# Patient Record
Sex: MALE | Race: BLACK OR AFRICAN AMERICAN | NOT HISPANIC OR LATINO | URBAN - METROPOLITAN AREA
[De-identification: names, ages, dates, MRNs, and addresses within clinical notes are randomized per-mention and may not be internally consistent; named-entity substitution may affect disease eponyms.]

---

## 2019-05-29 ENCOUNTER — OFFICE (OUTPATIENT)
Dept: URBAN - METROPOLITAN AREA CLINIC 19 | Facility: CLINIC | Age: 39
End: 2019-05-29

## 2019-05-29 VITALS
HEART RATE: 72 BPM | HEIGHT: 72 IN | DIASTOLIC BLOOD PRESSURE: 77 MMHG | SYSTOLIC BLOOD PRESSURE: 129 MMHG | WEIGHT: 249 LBS

## 2019-05-29 DIAGNOSIS — K62.5 HEMORRHAGE OF ANUS AND RECTUM: ICD-10-CM

## 2019-05-29 DIAGNOSIS — K62.89 OTHER SPECIFIED DISEASES OF ANUS AND RECTUM: ICD-10-CM

## 2019-05-29 DIAGNOSIS — K21.9 GASTRO-ESOPHAGEAL REFLUX DISEASE WITHOUT ESOPHAGITIS: ICD-10-CM

## 2019-05-29 PROCEDURE — 99204 OFFICE O/P NEW MOD 45 MIN: CPT | Performed by: INTERNAL MEDICINE

## 2019-05-29 RX ORDER — RANITIDINE 150 MG/1
300 TABLET ORAL
Qty: 60 | Refills: 11 | Status: ACTIVE
Start: 2019-05-29

## 2019-05-29 RX ORDER — HYDROCORTISONE 25 MG/G
CREAM TOPICAL
Qty: 1 | Refills: 1 | Status: ACTIVE
Start: 2019-05-29

## 2019-05-29 RX ORDER — SODIUM PICOSULFATE, MAGNESIUM OXIDE, AND ANHYDROUS CITRIC ACID 10; 3.5; 12 MG/160ML; G/160ML; G/160ML
LIQUID ORAL
Qty: 1 | Refills: 0 | Status: ACTIVE
Start: 2019-05-29

## 2019-05-29 NOTE — SERVICENOTES
Given the patient's persistent issues with what sounds like possible hemorrhoids, skin tags, and anal fissure as well as some rectal bleeding we will proceed with colonoscopy.  I did offer flex sig but he would like to proceed with full colonoscopy.  In the meantime, given his mild reflux I do recommend that he work on dietary modification and weight loss, which we discussed today.  I will give a trial of H2 blocker which she states he would prefer if this does not work we can consider pantoprazole.  If he is not respond well to medications for this then we can consider endoscopic evaluation as well.

## 2019-05-29 NOTE — SERVICEHPINOTES
Mr. Dunham is a 39-year-old man here for evaluation of some rectal bleeding, anal pain, and reflux. The patient was initially seen by me for similar symptoms in March 2016 when he was having some anal protrusions. At that time I did recommend colonoscopy in we gave him a trial of hydrocortisone with pramoxine. We did check some basic blood work which was unremarkable except for minimal elevation of sed rate. Unfortunate, the patient states that he did not have good insurance or any insurance around that time and so failed to follow-up but now would like further evaluation. He states that he will have occasional reflux which has been improved with PPI and H2 blocker in the past. It only occasionally happens and is with certain foods. He denies any significant abdominal pain. He states that he has gained around 30 lb over the past year. He also has been having some anal discomfort with bowel movements which he describes as a dull pain but sometimes can be sharp her, especially with wiping. When he does palpate down there he does feel as though there is an occasional rectal protrusion. There is also occasional scant amount of red blood per rectum with wiping but no evidence of any large volume bleeding. He states that his bowel movements occur every other day and usually hard. He denies any fevers, chills, nausea, or vomiting.